# Patient Record
Sex: MALE | Race: WHITE
[De-identification: names, ages, dates, MRNs, and addresses within clinical notes are randomized per-mention and may not be internally consistent; named-entity substitution may affect disease eponyms.]

---

## 2018-06-18 ENCOUNTER — HOSPITAL ENCOUNTER (EMERGENCY)
Dept: HOSPITAL 59 - ER | Age: 53
Discharge: HOME | End: 2018-06-18
Payer: COMMERCIAL

## 2018-06-18 DIAGNOSIS — L03.113: Primary | ICD-10-CM

## 2018-06-18 LAB
ALBUMIN SERPL-MCNC: 4.8 G/DL (ref 4–5)
ALBUMIN/GLOB SERPL: 1.9 {RATIO} (ref 1.1–1.8)
ALP SERPL-CCNC: 59 U/L (ref 40–129)
ALT SERPL-CCNC: 20 U/L (ref ?–41)
ANION GAP SERPL CALC-SCNC: 17 MMOL/L (ref 7–16)
AST SERPL-CCNC: 21 U/L (ref 10–50)
BASOPHILS NFR BLD: 0.6 % (ref 0–6)
BILIRUB SERPL-MCNC: 0.4 MG/DL (ref 0.2–1)
BUN SERPL-MCNC: 16 MG/DL (ref 6–20)
CO2 SERPL-SCNC: 28 MMOL/L (ref 22–29)
CREAT SERPL-MCNC: 1 MG/DL (ref 0.7–1.2)
CRP SERPL-MCNC: 0.45 MG/DL (ref ?–0.5)
EOSINOPHIL NFR BLD: 2.2 % (ref 0–6)
ERYTHROCYTE [DISTWIDTH] IN BLOOD BY AUTOMATED COUNT: 12.7 % (ref 11.5–14.5)
ERYTHROCYTE [SEDIMENTATION RATE] IN BLOOD: 13 MM/HR (ref 0–20)
EST GLOMERULAR FILTRATION RATE: > 60 ML/MIN
GLOBULIN SER-MCNC: 2.5 GM/DL (ref 1.4–4.8)
GLUCOSE SERPL-MCNC: 92 MG/DL (ref 74–109)
GRANULOCYTES NFR BLD: 57.2 % (ref 47–80)
HCT VFR BLD CALC: 40.3 % (ref 42–52)
HGB BLD-MCNC: 13.9 GM/DL (ref 14–18)
LYMPHOCYTES NFR BLD AUTO: 31.1 % (ref 16–45)
MCH RBC QN AUTO: 30.6 PG (ref 27–33)
MCHC RBC AUTO-ENTMCNC: 34.5 G/DL (ref 32–36)
MCV RBC AUTO: 89 FL (ref 81–97)
MONOCYTES NFR BLD: 8.9 % (ref 0–9)
PLATELET # BLD: 380 K/UL (ref 130–400)
PMV BLD AUTO: 8.8 FL (ref 7.4–10.4)
PROT SERPL-MCNC: 7.3 G/DL (ref 6.6–8.7)
RBC # BLD AUTO: 4.53 M/UL (ref 4.4–5.7)
WBC # BLD AUTO: 6.4 K/UL (ref 4.2–12.2)

## 2018-06-18 PROCEDURE — 99284 EMERGENCY DEPT VISIT MOD MDM: CPT

## 2018-06-18 PROCEDURE — 85651 RBC SED RATE NONAUTOMATED: CPT

## 2018-06-18 PROCEDURE — 85025 COMPLETE CBC W/AUTO DIFF WBC: CPT

## 2018-06-18 PROCEDURE — 80053 COMPREHEN METABOLIC PANEL: CPT

## 2018-06-18 PROCEDURE — 86140 C-REACTIVE PROTEIN: CPT

## 2018-06-18 RX ADMIN — CLINDAMYCIN PHOSPHATE ONE MLS/HR: 12 INJECTION, SOLUTION INTRAVENOUS at 20:33

## 2018-06-18 NOTE — EMERGENCY DEPARTMENT RECORD
History of Present Illness





- General


Chief complaint: Extremity Problem


Stated complaint: INFECTION ON RIGHT ARM


Time Seen by Provider: 18 20:10


Source: Patient


Mode of Arrival: Ambulatory


Limitations: No limitations





- History of Present Illness


Initial comments: 





53 yo male presents to ED for evaluation or redness, warmth, and pain to the 

right distal forearm that has begun to streak to the anti-cubital region.  

Patient reports that he picked a small "herrera" 48 hours ago, symptoms have 

worsened s9ince that time.  Patient denies fevers, chills, or recent illness, 

denies pain with movement of the wrist of elbow.  Patient denies history of DM, 

denies health problems at his baseline.


MD Complaint: Extremity pain


Onset/Timin


-: Hour(s)


Location: Right


History of Same: No


-: Yes Myalgia


Radiation: Proximal


Quality: Aching


Consistency: Constant


Improves with: Nothing


Worsens with: Nothing


Associated Symptoms: Denies other symptoms





- Related Data


 Previous Rx's











 Medication  Instructions  Recorded


 


Clindamycin HCl 300 mg PO Q6H #39 capsule 18











 Allergies











Allergy/AdvReac Type Severity Reaction Status Date / Time


 


acetaminophen [From [place]] AdvReac  ALTERED Verified 18 20:21





   MENTAL  





   STATUS  


 


hydrocodone [From [place]] AdvReac  ALTERED Verified 18 20:21





   MENTAL  





   STATUS  














Review of Systems


Constitutional: Denies: Chills, Fever, Malaise, Night sweats


Eyes: Denies: Eye discharge, Eye pain


ENT: Denies: Ear pain, Epistaxis


Respiratory: Denies: Cough, Dyspnea


Cardiovascular: Denies: Chest pain, Dyspnea on exertion


Endocrine: Denies: Fatigue, Heat or cold intolerance


Gastrointestinal: Denies: Abdominal pain, Nausea, Vomiting


Genitourinary: Denies: Incontinence, Retention


Musculoskeletal: Reports: Myalgia.  Denies: Arthralgia, Back pain, Gout, Joint 

swelling


Skin: Reports: Rash.  Denies: Bruising, Change in color


Neurological: Denies: Abnormal gait, Confusion, Headache, Seizure


Psychiatric: Denies: Anxiety


Hematological/Lymphatic: Denies: Anemia, Blood Clots





Past Medical History





- SOCIAL HISTORY


Smoking Status: Never smoker





- RESPIRATORY


Hx Respiratory Disorders: No





- CARDIOVASCULAR


Hx Cardio Disorders: No





- NEURO


Hx Neuro Disorders: No





- GI


Hx GI Disorders: No





- 


Hx Genitourinary Disorders: No





- ENDOCRINE


Hx Endocrine Disorders: No





- MUSCULOSKELETAL


Hx Musculoskeletal Disorders: No





- PSYCH


Hx Psych Problems: No





- HEMATOLOGY/ONCOLOGY


Hx Hematology/Oncology Disorders: No





Physical Exam





- General


General Appearance: Alert, Oriented x3, Cooperative, Mild distress


Limitations: No limitations





- Head


Head exam: Atraumatic, Normocephalic, Normal inspection


Head exam detail: negative: Abrasion, Contusion, Pierce's sign, General 

tenderness, Hematoma, Laceration





- Eye


Eye exam: Normal appearance.  negative: Conjunctival injection, Periorbital 

swelling, Periorbital tenderness, Scleral icterus





- ENT


Ear exam: negative: Auricular hematoma, Auricular trauma


Nasal Exam: negative: Active bleeding, Discharge, Dried blood, Foreign body


Mouth exam: negative: Drooling, Laceration, Muffled voice, Tongue elevation





- Neck


Neck exam: Normal inspection.  negative: Meningismus, Tenderness





- Respiratory


Respiratory exam: Normal lung sounds bilaterally.  negative: Rales, Respiratory 

distress, Rhonchi, Stridor





- Cardiovascular


Cardiovascular Exam: Regular rate, Normal rhythm, Normal heart sounds


Peripheral Pulses: 3+: Radial (R)





- GI/Abdominal


GI/Abdominal exam: Soft.  negative: Rebound, Rigid, Tenderness





- Rectal


Rectal exam: Deferred





- 


 exam: Deferred





- Extremities


Extremities exam: Tenderness, Other (Redness to the distal right forearm, no 

abscess is present, no induration but is warm to palpation.  There is mild 

streaking to the level of the anticubital region on examination).  negative: 

Calf tenderness, Pedal edema





- Back


Back exam: Denies: CVA tenderness (R), CVA tenderness (L)





- Neurological


Neurological exam: Alert, Normal gait, Oriented X3





- Psychiatric


Psychiatric exam: Normal affect, Normal mood





- Skin


Skin exam: Erythema.  negative: Abrasion


Type of lesion: negative: abrasion


Distribution of rash: RUE


Description of rash: Confluent, Macular





Course





- Reevaluation(s)


Reevaluation #1: 





18 21:00


Labs reviewed and are grossly unremarkable for an acute process.


Patient reassessed and cellulitis was outlined.


Will discharge home on Clindamycin with instructions to return to ED in 48 

hours for reassessment, sooner if symptoms worsen.  


Patient is in agreement with the plan of care as discussed.





Medical Decision Making





- Lab Data


Result diagrams: 


 18 20:28





 18 20:28





Disposition


Disposition: Discharge


Clinical Impression: 


 Cellulitis of forearm, right





Disposition: Home, Self-Care


Condition: (2) Stable


Instructions:  Cellulitis (ED)


Additional Instructions: 


Return to ED in 48 hours for reassessment, sooner if symptoms worsen.


Clindamycin as directed.   


Follow-up with your family doctor in 1-3 days as directed.


Prescriptions: 


Clindamycin HCl 300 mg PO Q6H #39 capsule


Forms:  Patient Portal Access


Time of Disposition: 21:01





Quality





- Quality Measures


Quality Measures: N/A





- Blood Pressure Screening


Does Patient Have Any of the Following: No


Blood Pressure Classification: Pre-Hypertensive BP Reading


Systolic Measurement: 141


Diastolic Measurement: 89


Screening for High Blood Pressure: < Pre-Hypertensive BP, F/U Documented > [

]


Pre-Hypertensive Follow-up Interventions: Referral to alternative/primary care 

provider.